# Patient Record
Sex: FEMALE | Race: WHITE | NOT HISPANIC OR LATINO | Employment: FULL TIME | URBAN - NONMETROPOLITAN AREA
[De-identification: names, ages, dates, MRNs, and addresses within clinical notes are randomized per-mention and may not be internally consistent; named-entity substitution may affect disease eponyms.]

---

## 2023-09-22 ENCOUNTER — OFFICE VISIT (OUTPATIENT)
Dept: FAMILY MEDICINE CLINIC | Facility: CLINIC | Age: 23
End: 2023-09-22
Payer: COMMERCIAL

## 2023-09-22 VITALS
BODY MASS INDEX: 43.6 KG/M2 | HEART RATE: 76 BPM | WEIGHT: 236.9 LBS | OXYGEN SATURATION: 98 % | DIASTOLIC BLOOD PRESSURE: 78 MMHG | SYSTOLIC BLOOD PRESSURE: 122 MMHG | HEIGHT: 62 IN

## 2023-09-22 DIAGNOSIS — Z13.220 SCREENING CHOLESTEROL LEVEL: ICD-10-CM

## 2023-09-22 DIAGNOSIS — E66.01 MORBID (SEVERE) OBESITY DUE TO EXCESS CALORIES: Primary | Chronic | ICD-10-CM

## 2023-09-22 DIAGNOSIS — Z11.59 NEED FOR HEPATITIS C SCREENING TEST: ICD-10-CM

## 2023-09-22 DIAGNOSIS — J31.0 RHINITIS, UNSPECIFIED TYPE: ICD-10-CM

## 2023-09-22 DIAGNOSIS — H69.82 DYSFUNCTION OF LEFT EUSTACHIAN TUBE: ICD-10-CM

## 2023-09-22 DIAGNOSIS — Z01.419 WELL WOMAN EXAM: ICD-10-CM

## 2023-09-22 PROCEDURE — 99213 OFFICE O/P EST LOW 20 MIN: CPT | Performed by: FAMILY MEDICINE

## 2023-09-22 RX ORDER — FLUTICASONE PROPIONATE 50 MCG
SPRAY, SUSPENSION (ML) NASAL
Qty: 48 G | Refills: 3 | Status: SHIPPED | OUTPATIENT
Start: 2023-09-22

## 2023-09-22 NOTE — PROGRESS NOTES
Subjective   Cassandra Alegre is a 23 y.o. female.  Requesting primary care evaluation.  Most recent issue includes persistent eustachian tube dysfunction was seen for otitis media symptoms several weeks ago and.  Placed on antibiotics oral steroids followed by steroid no spray and eardrops.  States still having difficulty hearing and discomfort in the left ear following same.  Now states however the past couple days ears started to open up and feels some better.  Still using no spray and antihistamine.  Past history significant for appendectomy only.  Is never seen gynecology provider.  Does have a family history of polycystic ovary disease prediabetes possible metabolic syndrome.  History and sidebar generated.    History of Present Illness   Allergies  Pertinent negatives include no abdominal pain, chest pain, coughing, fatigue, headaches, joint swelling, nausea or vomiting.     The following portions of the patient's history were reviewed and updated as appropriate: allergies, current medications, past family history, past medical history, past social history, past surgical history, and problem list.    Review of Systems  Review of Systems   Constitutional:  Negative for activity change, appetite change, fatigue and unexpected weight change.   HENT:  Positive for ear pain and hearing loss. Negative for trouble swallowing and voice change.    Eyes:  Negative for redness and visual disturbance.   Respiratory:  Negative for cough and wheezing.    Cardiovascular:  Negative for chest pain and palpitations.   Gastrointestinal:  Negative for abdominal pain, constipation, diarrhea, nausea and vomiting.   Genitourinary:  Negative for urgency.   Musculoskeletal:  Negative for joint swelling.   Neurological:  Negative for syncope and headaches.   Hematological:  Negative for adenopathy.   Psychiatric/Behavioral:  Negative for sleep disturbance.      Objective   Physical Exam  Physical Exam  Constitutional:       Appearance:  "Normal appearance. She is well-developed. She is obese.   HENT:      Head: Normocephalic.      Right Ear: Hearing, tympanic membrane, ear canal and external ear normal.      Left Ear: Ear canal and external ear normal. Tympanic membrane is retracted.      Ears:      Comments: Left TM is indeed retracted but no fluid looks like is trying to open up canals clear right side is clear.     Nose: Nose normal. Mucosal edema present.      Comments: Mild bilateral mucosal edema turbinates are not particularly swollen     Mouth/Throat:      Comments: Clear  Eyes:      Pupils: Pupils are equal, round, and reactive to light.   Neck:      Thyroid: No thyromegaly.      Comments: No mass  Cardiovascular:      Rate and Rhythm: Normal rate and regular rhythm.      Heart sounds: Normal heart sounds. No murmur heard.    No friction rub. No gallop.   Pulmonary:      Breath sounds: Normal breath sounds.   Abdominal:      General: There is no distension.      Palpations: Abdomen is soft. There is no mass.      Tenderness: There is no abdominal tenderness.   Musculoskeletal:         General: Normal range of motion.      Cervical back: Normal range of motion.      Comments: Get up and go 3 to 5 seconds gait normal   Skin:     General: Skin is warm and dry.   Neurological:      Mental Status: She is alert and oriented to person, place, and time.      Deep Tendon Reflexes: Reflexes are normal and symmetric.   Psychiatric:         Attention and Perception: Attention normal.         Mood and Affect: Mood normal.         Speech: Speech normal.         Behavior: Behavior normal.         Thought Content: Thought content normal.         Cognition and Memory: Cognition normal.         Judgment: Judgment normal.         Visit Vitals  /78   Pulse 76   Ht 157.5 cm (62\")   Wt 107 kg (236 lb 14.4 oz)   LMP 08/21/2023 (Approximate)   SpO2 98%   BMI 43.33 kg/m²     Body mass index is 43.33 kg/m².    /78   Pulse 76   Ht 157.5 cm (62\")   Wt " 107 kg (236 lb 14.4 oz)   LMP 08/21/2023 (Approximate)   SpO2 98%   BMI 43.33 kg/m²     Assessment/Plan   Diagnoses and all orders for this visit:    1. Morbid (severe) obesity due to excess calories (Primary)  -     CBC (No Diff)  -     Comprehensive Metabolic Panel  -     Hemoglobin A1c    2. Need for hepatitis C screening test  -     Hepatitis C Antibody    3. Dysfunction of left eustachian tube  -     fluticasone (FLONASE) 50 MCG/ACT nasal spray; 2 sprays each nostril once a day  Dispense: 48 g; Refill: 3    4. Rhinitis, unspecified type  -     T4, Free  -     TSH  -     fluticasone (FLONASE) 50 MCG/ACT nasal spray; 2 sprays each nostril once a day  Dispense: 48 g; Refill: 3    5. Screening cholesterol level  -     Lipid Panel With LDL / HDL Ratio    6. Well woman exam  -     Ambulatory Referral to Obstetrics / Gynecology    Counseled on continuing steroid no spray for another 2 to 4 weeks and as needed.  Counseled on station of dysfunction.   on wt loss measures and programs  With family history screening lab work ordered.  [facility]  Counseled on lifestyle measures for follow-up based on results